# Patient Record
Sex: MALE | Race: WHITE | Employment: FULL TIME | URBAN - NONMETROPOLITAN AREA
[De-identification: names, ages, dates, MRNs, and addresses within clinical notes are randomized per-mention and may not be internally consistent; named-entity substitution may affect disease eponyms.]

---

## 2018-10-29 ENCOUNTER — APPOINTMENT (OUTPATIENT)
Dept: GENERAL RADIOLOGY | Age: 48
End: 2018-10-29
Payer: COMMERCIAL

## 2018-10-29 ENCOUNTER — HOSPITAL ENCOUNTER (EMERGENCY)
Age: 48
Discharge: HOME | End: 2018-10-29
Attending: EMERGENCY MEDICINE
Payer: COMMERCIAL

## 2018-10-29 VITALS
WEIGHT: 250 LBS | BODY MASS INDEX: 33.13 KG/M2 | HEART RATE: 67 BPM | DIASTOLIC BLOOD PRESSURE: 96 MMHG | RESPIRATION RATE: 14 BRPM | TEMPERATURE: 98.4 F | SYSTOLIC BLOOD PRESSURE: 134 MMHG | OXYGEN SATURATION: 96 % | HEIGHT: 73 IN

## 2018-10-29 DIAGNOSIS — R07.89 CHEST PAIN, ATYPICAL: Primary | ICD-10-CM

## 2018-10-29 LAB
ANION GAP SERPL CALCULATED.3IONS-SCNC: 15 MEQ/L (ref 8–16)
BASOPHILS # BLD: 0.5 %
BASOPHILS ABSOLUTE: 0.1 THOU/MM3 (ref 0–0.1)
BUN BLDV-MCNC: 13 MG/DL (ref 7–22)
CALCIUM SERPL-MCNC: 9.3 MG/DL (ref 8.5–10.5)
CHLORIDE BLD-SCNC: 105 MEQ/L (ref 98–111)
CO2: 23 MEQ/L (ref 23–33)
CREAT SERPL-MCNC: 0.9 MG/DL (ref 0.4–1.2)
D-DIMER QUANTITATIVE: 280 NG/ML FEU (ref 0–500)
EKG ATRIAL RATE: 63 BPM
EKG P AXIS: 8 DEGREES
EKG P-R INTERVAL: 130 MS
EKG Q-T INTERVAL: 386 MS
EKG QRS DURATION: 98 MS
EKG QTC CALCULATION (BAZETT): 395 MS
EKG R AXIS: 53 DEGREES
EKG T AXIS: 48 DEGREES
EKG VENTRICULAR RATE: 63 BPM
EOSINOPHIL # BLD: 3.4 %
EOSINOPHILS ABSOLUTE: 0.4 THOU/MM3 (ref 0–0.4)
ERYTHROCYTE [DISTWIDTH] IN BLOOD BY AUTOMATED COUNT: 12.9 % (ref 11.5–14.5)
ERYTHROCYTE [DISTWIDTH] IN BLOOD BY AUTOMATED COUNT: 39.8 FL (ref 35–45)
GFR SERPL CREATININE-BSD FRML MDRD: 90 ML/MIN/1.73M2
GLUCOSE BLD-MCNC: 92 MG/DL (ref 70–108)
HCT VFR BLD CALC: 46.4 % (ref 42–52)
HEMOGLOBIN: 14.9 GM/DL (ref 14–18)
IMMATURE GRANS (ABS): 0.03 THOU/MM3 (ref 0–0.07)
IMMATURE GRANULOCYTES: 0.3 %
LYMPHOCYTES # BLD: 31.9 %
LYMPHOCYTES ABSOLUTE: 3.3 THOU/MM3 (ref 1–4.8)
MCH RBC QN AUTO: 27.1 PG (ref 26–33)
MCHC RBC AUTO-ENTMCNC: 32.1 GM/DL (ref 32.2–35.5)
MCV RBC AUTO: 84.5 FL (ref 80–94)
MONOCYTES # BLD: 6.3 %
MONOCYTES ABSOLUTE: 0.6 THOU/MM3 (ref 0.4–1.3)
NUCLEATED RED BLOOD CELLS: 0 /100 WBC
OSMOLALITY CALCULATION: 284.7 MOSMOL/KG (ref 275–300)
PLATELET # BLD: 278 THOU/MM3 (ref 130–400)
PMV BLD AUTO: 10.2 FL (ref 9.4–12.4)
POTASSIUM SERPL-SCNC: 4.1 MEQ/L (ref 3.5–5.2)
PRO-BNP: 49.4 PG/ML (ref 0–450)
RBC # BLD: 5.49 MILL/MM3 (ref 4.7–6.1)
SEG NEUTROPHILS: 57.6 %
SEGMENTED NEUTROPHILS ABSOLUTE COUNT: 5.9 THOU/MM3 (ref 1.8–7.7)
SODIUM BLD-SCNC: 143 MEQ/L (ref 135–145)
TROPONIN T: < 0.01 NG/ML
TROPONIN T: < 0.01 NG/ML
WBC # BLD: 10.3 THOU/MM3 (ref 4.8–10.8)

## 2018-10-29 PROCEDURE — 85379 FIBRIN DEGRADATION QUANT: CPT

## 2018-10-29 PROCEDURE — 96374 THER/PROPH/DIAG INJ IV PUSH: CPT

## 2018-10-29 PROCEDURE — 6360000002 HC RX W HCPCS: Performed by: EMERGENCY MEDICINE

## 2018-10-29 PROCEDURE — 36415 COLL VENOUS BLD VENIPUNCTURE: CPT

## 2018-10-29 PROCEDURE — 84484 ASSAY OF TROPONIN QUANT: CPT

## 2018-10-29 PROCEDURE — 85025 COMPLETE CBC W/AUTO DIFF WBC: CPT

## 2018-10-29 PROCEDURE — 83880 ASSAY OF NATRIURETIC PEPTIDE: CPT

## 2018-10-29 PROCEDURE — 80048 BASIC METABOLIC PNL TOTAL CA: CPT

## 2018-10-29 PROCEDURE — 93010 ELECTROCARDIOGRAM REPORT: CPT | Performed by: INTERNAL MEDICINE

## 2018-10-29 PROCEDURE — 93005 ELECTROCARDIOGRAM TRACING: CPT | Performed by: EMERGENCY MEDICINE

## 2018-10-29 PROCEDURE — 71045 X-RAY EXAM CHEST 1 VIEW: CPT

## 2018-10-29 PROCEDURE — 99285 EMERGENCY DEPT VISIT HI MDM: CPT

## 2018-10-29 RX ORDER — NAPROXEN 500 MG/1
500 TABLET ORAL 2 TIMES DAILY WITH MEALS
Qty: 20 TABLET | Refills: 0 | Status: SHIPPED | OUTPATIENT
Start: 2018-10-29

## 2018-10-29 RX ORDER — KETOROLAC TROMETHAMINE 30 MG/ML
30 INJECTION, SOLUTION INTRAMUSCULAR; INTRAVENOUS ONCE
Status: COMPLETED | OUTPATIENT
Start: 2018-10-29 | End: 2018-10-29

## 2018-10-29 RX ADMIN — KETOROLAC TROMETHAMINE 30 MG: 30 INJECTION, SOLUTION INTRAMUSCULAR at 21:40

## 2018-10-29 ASSESSMENT — PAIN DESCRIPTION - FREQUENCY
FREQUENCY: CONTINUOUS

## 2018-10-29 ASSESSMENT — ENCOUNTER SYMPTOMS
ABDOMINAL PAIN: 0
EYE REDNESS: 0
COUGH: 0
BACK PAIN: 0
RHINORRHEA: 0
CHEST TIGHTNESS: 1
EYE DISCHARGE: 0
WHEEZING: 0
SHORTNESS OF BREATH: 0
NAUSEA: 0
VOMITING: 0
SORE THROAT: 0
DIARRHEA: 0

## 2018-10-29 ASSESSMENT — PAIN DESCRIPTION - DESCRIPTORS
DESCRIPTORS: TIGHTNESS;CONSTANT
DESCRIPTORS: CONSTANT;TIGHTNESS
DESCRIPTORS: TIGHTNESS;CONSTANT
DESCRIPTORS: TIGHTNESS;CONSTANT

## 2018-10-29 ASSESSMENT — PAIN DESCRIPTION - PAIN TYPE
TYPE: ACUTE PAIN

## 2018-10-29 ASSESSMENT — PAIN SCALES - GENERAL
PAINLEVEL_OUTOF10: 3

## 2018-10-29 ASSESSMENT — PAIN DESCRIPTION - ONSET: ONSET: ON-GOING

## 2018-10-29 ASSESSMENT — PAIN DESCRIPTION - LOCATION
LOCATION: CHEST

## 2018-10-29 ASSESSMENT — HEART SCORE: ECG: 0

## 2018-10-31 ENCOUNTER — OFFICE VISIT (OUTPATIENT)
Dept: CARDIOLOGY CLINIC | Age: 48
End: 2018-10-31
Payer: COMMERCIAL

## 2018-10-31 VITALS
WEIGHT: 264.6 LBS | DIASTOLIC BLOOD PRESSURE: 72 MMHG | HEART RATE: 76 BPM | HEIGHT: 73 IN | SYSTOLIC BLOOD PRESSURE: 130 MMHG | BODY MASS INDEX: 35.07 KG/M2

## 2018-10-31 DIAGNOSIS — M94.0 COSTOCHONDRITIS: Primary | ICD-10-CM

## 2018-10-31 PROCEDURE — G8417 CALC BMI ABV UP PARAM F/U: HCPCS | Performed by: INTERNAL MEDICINE

## 2018-10-31 PROCEDURE — 99203 OFFICE O/P NEW LOW 30 MIN: CPT | Performed by: INTERNAL MEDICINE

## 2018-10-31 PROCEDURE — 1036F TOBACCO NON-USER: CPT | Performed by: INTERNAL MEDICINE

## 2018-10-31 PROCEDURE — G8427 DOCREV CUR MEDS BY ELIG CLIN: HCPCS | Performed by: INTERNAL MEDICINE

## 2018-10-31 PROCEDURE — G8484 FLU IMMUNIZE NO ADMIN: HCPCS | Performed by: INTERNAL MEDICINE

## 2018-10-31 NOTE — PROGRESS NOTES
family history includes Diabetes in his sister; Heart Disease in his mother; High Blood Pressure in his father. There is no family history of bicuspid aortic valve, aneurysms, heart transplant, pacemakers, defibrillators, or sudden cardiac death. Past Surgical History   Past Surgical History:   Procedure Laterality Date    CHOLECYSTECTOMY         Review of Systems   Constitutional: Negative for chills and fever  HENT: Negative for congestion, sinus pressure, sneezing and sore throat. Eyes: Negative for pain, discharge, redness and itching. Respiratory: Negative for apnea, cough  Gastrointestinal: Negative for blood in stool, constipation, diarrhea   Endocrine: Negative for cold intolerance, heat intolerance, polydipsia. Genitourinary: Negative for dysuria, enuresis, flank pain and hematuria. Musculoskeletal: Negative for arthralgias, joint swelling and neck pain. Neurological: Negative for numbness and headaches. Psychiatric/Behavioral: Negative for agitation, confusion, decreased concentration and dysphoric mood. Objective:     BP (!) 144/88   Pulse 76   Ht 6' 1\" (1.854 m)   Wt 264 lb 9.6 oz (120 kg)   BMI 34.91 kg/m²     Wt Readings from Last 3 Encounters:   10/31/18 264 lb 9.6 oz (120 kg)   10/29/18 250 lb (113.4 kg)   04/08/16 260 lb (117.9 kg)     BP Readings from Last 3 Encounters:   10/31/18 (!) 144/88   10/29/18 (!) 134/96   04/08/16 124/68       Nursing note and vitals reviewed. Physical Exam   Constitutional: Oriented to person, place, and time. Appears well-developed and well-nourished. HENT:   Head: Normocephalic and atraumatic. Eyes: EOM are normal. Pupils are equal, round, and reactive to light. Neck: Normal range of motion. Neck supple. No JVD present. Cardiovascular: Normal rate, regular rhythm, normal heart sounds and intact distal pulses. No murmur heard. Pain with palpation of the left side of sternum.   Pulmonary/Chest: Effort normal and breath sounds

## 2020-12-03 ENCOUNTER — HOSPITAL ENCOUNTER (EMERGENCY)
Age: 50
Discharge: HOME | End: 2020-12-03
Payer: COMMERCIAL

## 2020-12-03 VITALS
HEART RATE: 78 BPM | DIASTOLIC BLOOD PRESSURE: 78 MMHG | RESPIRATION RATE: 16 BRPM | TEMPERATURE: 98.3 F | OXYGEN SATURATION: 98 % | SYSTOLIC BLOOD PRESSURE: 132 MMHG

## 2020-12-03 PROCEDURE — 99215 OFFICE O/P EST HI 40 MIN: CPT

## 2020-12-03 PROCEDURE — 99214 OFFICE O/P EST MOD 30 MIN: CPT | Performed by: NURSE PRACTITIONER

## 2020-12-03 PROCEDURE — U0003 INFECTIOUS AGENT DETECTION BY NUCLEIC ACID (DNA OR RNA); SEVERE ACUTE RESPIRATORY SYNDROME CORONAVIRUS 2 (SARS-COV-2) (CORONAVIRUS DISEASE [COVID-19]), AMPLIFIED PROBE TECHNIQUE, MAKING USE OF HIGH THROUGHPUT TECHNOLOGIES AS DESCRIBED BY CMS-2020-01-R: HCPCS

## 2020-12-03 RX ORDER — BENZONATATE 200 MG/1
200 CAPSULE ORAL 3 TIMES DAILY PRN
Qty: 30 CAPSULE | Refills: 0 | Status: SHIPPED | OUTPATIENT
Start: 2020-12-03 | End: 2020-12-10

## 2020-12-03 ASSESSMENT — PAIN DESCRIPTION - LOCATION: LOCATION: BACK

## 2020-12-03 ASSESSMENT — ENCOUNTER SYMPTOMS
VOMITING: 0
RHINORRHEA: 0
SHORTNESS OF BREATH: 0
NAUSEA: 0
COUGH: 1
SORE THROAT: 1
BACK PAIN: 1

## 2020-12-03 ASSESSMENT — PAIN SCALES - GENERAL: PAINLEVEL_OUTOF10: 4

## 2020-12-03 NOTE — ED TRIAGE NOTES
Clayton Heart arrives to room with complaint of cough fatigue body aches chills symptoms started 10 days ago.

## 2020-12-03 NOTE — ED PROVIDER NOTES
[facility]       CHIEF COMPLAINT       Chief Complaint   Patient presents with    Fatigue    Headache    Chills       Nurses Notes reviewed and I agree except as noted in the HPI. HISTORY OF PRESENT ILLNESS   Inis Koyanagi is a 48 y.o. male who presents with complaints of fatigue, headache, chills, and cough for the past week. 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 He states he has ear ringing and fullness with a persistent sore throat. He has tried over-the-counter medications with mild relief. The symptoms are persistent but believes they are slightly improved in the past few days. The history is provided by the patient. REVIEW OF SYSTEMS     Review of Systems   Constitutional: Positive for fatigue. Negative for fever. HENT: Positive for sore throat. Negative for congestion and rhinorrhea. Respiratory: Positive for cough. Negative for shortness of breath. Cardiovascular: Negative for chest pain. Gastrointestinal: Negative for nausea and vomiting. Musculoskeletal: Positive for back pain and myalgias. Skin: Negative for rash. Neurological: Positive for headaches. Negative for weakness. PAST MEDICAL HISTORY         Diagnosis Date    Asthma        SURGICALHISTORY     Patient  has a past surgical history that includes Cholecystectomy. CURRENT MEDICATIONS       Previous Medications    ACETAMINOPHEN (TYLENOL) 325 MG TABLET    Take 650 mg by mouth every 6 hours as needed.     ALBUTEROL SULFATE HFA (VENTOLIN HFA) 108 (90 BASE) MCG/ACT INHALER    Inhale 2 puffs into the lungs every 6 hours as needed for Wheezing    ASPIRIN 81 MG TABLET    Take 81 mg by mouth daily    IBUPROFEN (ADVIL;MOTRIN) 400 MG TABLET    Take 400 mg by mouth every 6 hours as needed for Pain    NAPROXEN (NAPROXEN) 500 MG EC TABLET    Take 1 tablet by mouth 2 times daily (with meals)       ALLERGIES     Patient is is allergic to nuzhat-1 [lidocaine] and penicillins. Patients   Immunization History   Administered Date(s) Administered    Tdap (Boostrix, Adacel) 04/08/2016       FAMILY HISTORY     Patient's family history includes Diabetes in his sister; Heart Disease in his mother; High Blood Pressure in his father. SOCIAL HISTORY     Patient  reports that he has never smoked. He has never used smokeless tobacco. He reports current alcohol use. He reports that he does not use drugs. PHYSICAL EXAM     ED TRIAGE VITALS  BP: 132/78, Temp: 98.3 °F (36.8 °C), Pulse: 78, Resp: 16, SpO2: 98 %,Estimated body mass index is 34.91 kg/m² as calculated from the following:    Height as of 10/31/18: 6' 1\" (1.854 m). Weight as of 10/31/18: 264 lb 9.6 oz (120 kg). ,No LMP for male patient. Physical Exam  Constitutional:       Appearance: Normal appearance. He is not ill-appearing. HENT:      Right Ear: Tympanic membrane, ear canal and external ear normal. There is no impacted cerumen. Left Ear: Tympanic membrane, ear canal and external ear normal. There is no impacted cerumen. Nose: No congestion or rhinorrhea. Mouth/Throat:      Mouth: Mucous membranes are moist.      Pharynx: No posterior oropharyngeal erythema. Neck:      Musculoskeletal: No muscular tenderness. Cardiovascular:      Rate and Rhythm: Normal rate and regular rhythm. Heart sounds: Normal heart sounds. Pulmonary:      Effort: Pulmonary effort is normal.      Breath sounds: Normal breath sounds. No wheezing, rhonchi or rales. Abdominal:      Tenderness: There is no right CVA tenderness or left CVA tenderness. Lymphadenopathy:      Cervical: No cervical adenopathy. Skin:     General: Skin is warm and dry. Capillary Refill: Capillary refill takes less than 2 seconds. Neurological:      Mental Status: He is alert and oriented to person, place, and time.    Psychiatric:         Behavior:

## 2020-12-04 ENCOUNTER — CARE COORDINATION (OUTPATIENT)
Dept: CARE COORDINATION | Age: 50
End: 2020-12-04

## 2020-12-04 NOTE — CARE COORDINATION
Urgent Care visit for COVID exposure. He had symptoms of sore throat, fatigue and cough last week but doesn't really have any symptoms now. Encouraged to continue symptom monitoring. He is self quarantining until gets test result. Patient contacted regarding COVID-19 exposure. Discussed COVID-19 related testing which was pending at this time. Test results were pending. Patient informed of results, if available? NA-still pending. Care Transition Nurse/ Ambulatory Care Manager contacted the patient by telephone to perform post discharge assessment. Call within 2 business days of discharge: Yes. Verified name and  with patient as identifiers. Provided introduction to self, and explanation of the CTN/ACM role, and reason for call due to risk factors for infection and/or exposure to COVID-19. Symptoms reviewed with patient who verbalized the following symptoms: no new symptoms and no worsening symptoms. Due to no new or worsening symptoms encounter was not routed to provider for escalation. Discussed follow-up appointments. If no appointment was previously scheduled, appointment scheduling offered: [facility] follow up appointment(s): No future appointments. Non-[facility] follow up appointment(s): NA    Non-face-to-face services provided:  Reviewed and followed up on pending diagnostic tests and treatments-COVID test  Education of patient/family/caregiver/guardian to support self-management-symptom monitoring and treatment     Advance Care Planning:   Does patient have an Advance Directive:  reviewed and current. Patient has following risk factors of: no known risk factors. CTN/ACM reviewed discharge instructions, medical action plan and red flags such as increased shortness of breath, increasing fever and signs of decompensation with patient who verbalized understanding. Discussed exposure protocols and quarantine with CDC Guidelines What to do if you are sick with coronavirus disease 2019.  Patient was given an opportunity for questions and concerns. The patient agrees to contact the [facility] exposure line [phone], local [facility] department [place] [facility]: ([phone]) and PCP office for questions related to their healthcare. CTN/ACM provided contact information for future needs. Reviewed and educated patient on any new and changed medications related to discharge diagnosis     Patient/family/caregiver given information for Get[employer] Loop and agrees to enroll yes  Patient's preferred e-mail: [email or web address]   Patient's preferred phone number: [phone]  Based on Loop alert triggers, patient will be contacted by nurse care manager for worsening symptoms. Pt will be further monitored by COVID Loop Team based on severity of symptoms and risk factors.

## 2020-12-06 LAB — SARS-COV-2: DETECTED

## 2021-04-14 ENCOUNTER — HOSPITAL ENCOUNTER (EMERGENCY)
Age: 51
Discharge: HOME | End: 2021-04-14
Attending: EMERGENCY MEDICINE
Payer: COMMERCIAL

## 2021-04-14 VITALS
HEART RATE: 66 BPM | OXYGEN SATURATION: 95 % | RESPIRATION RATE: 18 BRPM | DIASTOLIC BLOOD PRESSURE: 81 MMHG | TEMPERATURE: 97.1 F | SYSTOLIC BLOOD PRESSURE: 160 MMHG

## 2021-04-14 DIAGNOSIS — J45.909 ASTHMATIC BRONCHITIS WITHOUT COMPLICATION, UNSPECIFIED ASTHMA SEVERITY, UNSPECIFIED WHETHER PERSISTENT: Primary | ICD-10-CM

## 2021-04-14 PROCEDURE — 6370000000 HC RX 637 (ALT 250 FOR IP): Performed by: STUDENT IN AN ORGANIZED HEALTH CARE EDUCATION/TRAINING PROGRAM

## 2021-04-14 PROCEDURE — 99213 OFFICE O/P EST LOW 20 MIN: CPT

## 2021-04-14 PROCEDURE — 99213 OFFICE O/P EST LOW 20 MIN: CPT | Performed by: EMERGENCY MEDICINE

## 2021-04-14 RX ORDER — PREDNISONE 10 MG/1
40 TABLET ORAL ONCE
Status: COMPLETED | OUTPATIENT
Start: 2021-04-14 | End: 2021-04-14

## 2021-04-14 RX ORDER — PREDNISONE 20 MG/1
40 TABLET ORAL DAILY
Qty: 10 TABLET | Refills: 0 | Status: SHIPPED | OUTPATIENT
Start: 2021-04-14 | End: 2021-04-19

## 2021-04-14 RX ADMIN — PREDNISONE 40 MG: 10 TABLET ORAL at 20:17

## 2021-04-14 ASSESSMENT — ENCOUNTER SYMPTOMS
EYE PAIN: 0
COUGH: 1
DIARRHEA: 0
TROUBLE SWALLOWING: 0
NAUSEA: 0
VOMITING: 0
SHORTNESS OF BREATH: 0
CONSTIPATION: 0
ABDOMINAL PAIN: 0
WHEEZING: 1
BLOOD IN STOOL: 0

## 2021-04-14 NOTE — LETTER
[facility]  [address] [place] [id number]  Phone: [phone]             April 14, 2021    Patient: Steve Cao   YOB: 1970   Date of Visit: 4/14/2021       To Whom It May Concern:    Shanna Benitez was seen and treated in our emergency department on 4/14/2021. He may return to work on 04/17/21.       Sincerely,             Signature:__________________________________

## 2021-04-14 NOTE — ED TRIAGE NOTES
Pt walked to room 4. Pt here with complaints of a sore throat, chest congestion, productive cough--yellow/brown, having trouble laying down, swollen ears. Started Sunday April 4.

## 2021-04-14 NOTE — ED PROVIDER NOTES
[address]       Chief Complaint   Patient presents with    Cough     Productive cough--yellow/brown, sore throat, chest congestion. Pt thought allergies. Nurses Notes reviewed and I agree except as noted in the HPI. HISTORY OF PRESENT ILLNESS   Vivi Arana is a 48 y.o. male who presents sore throat since 4/5/21. Patient believed that he had allergies at the start and was taking mucinex at that time. His symptoms generally improved but he states that he has been having some issues with chest congestion over the weekend. Patient states he has a history of allergy and asthma and has an albuterol inhaler at home which has helped with some of his symptoms. He states that he was wheezing over the weekend. Patient was diagnosed with COVID-19 in December 2020 and recently got the [address] [facility] vaccine on April 4. REVIEW OF SYSTEMS     Review of Systems   Constitutional: Negative for chills, fatigue and fever. HENT: Negative for congestion, ear pain, postnasal drip and trouble swallowing. Eyes: Negative for pain and visual disturbance. Respiratory: Positive for cough and wheezing. Negative for shortness of breath. Cardiovascular: Negative for chest pain and palpitations. Gastrointestinal: Negative for abdominal pain, blood in stool, constipation, diarrhea, nausea and vomiting. Genitourinary: Negative for dysuria and urgency. Skin: Negative for rash and wound. Neurological: Negative for dizziness and headaches. Psychiatric/Behavioral: Negative for dysphoric mood. The patient is not nervous/anxious. PAST MEDICAL HISTORY         Diagnosis Date    Asthma        SURGICAL HISTORY     Patient  has a past surgical history that includes Cholecystectomy.     CURRENT MEDICATIONS       Discharge Medication List as of 4/14/2021  8:05 PM      CONTINUE these medications which have NOT CHANGED    Details   aspirin 81 MG tablet Take 81 mg by mouth dailyHistorical Med      naproxen (NAPROXEN) 500 MG EC tablet Take 1 tablet by mouth 2 times daily (with meals), Disp-20 tablet, R-0Print      ibuprofen (ADVIL;MOTRIN) 400 MG tablet Take 400 mg by mouth every 6 hours as needed for Pain      albuterol sulfate HFA (VENTOLIN HFA) 108 (90 BASE) MCG/ACT inhaler Inhale 2 puffs into the lungs every 6 hours as needed for Wheezing, Disp-1 Inhaler, R-3      acetaminophen (TYLENOL) 325 MG tablet Take 650 mg by mouth every 6 hours as needed. ALLERGIES     Patient is is allergic to nuzhat-1 [lidocaine] and penicillins. FAMILY HISTORY     Patient'sfamily history includes Diabetes in his sister; Heart Disease in his mother; High Blood Pressure in his father. SOCIAL HISTORY     Patient  reports that he has never smoked. He has never used smokeless tobacco. He reports current alcohol use. He reports that he does not use drugs. PHYSICAL EXAM     ED TRIAGE VITALS  BP: (!) 160/81, Temp: 97.1 °F (36.2 °C), Pulse: 66, Resp: 18, SpO2: 95 %  Physical Exam  Vitals signs and nursing note reviewed. Constitutional:       General: He is not in acute distress. Appearance: He is well-developed. He is not diaphoretic. HENT:      Head: Normocephalic and atraumatic. Right Ear: External ear normal.      Left Ear: External ear normal.      Nose: Nose normal.   Eyes:      General: No scleral icterus. Right eye: No discharge. Left eye: No discharge. Conjunctiva/sclera: Conjunctivae normal.   Neck:      Musculoskeletal: Normal range of motion. Cardiovascular:      Rate and Rhythm: Normal rate and regular rhythm. Heart sounds: Normal heart sounds. No murmur. Pulmonary:      Effort: Pulmonary effort is normal.      Breath sounds: Normal breath sounds. Skin:     General: Skin is warm and dry. Findings: No erythema or rash. Neurological:      Mental Status: He is alert and oriented to person, place, and time. Cranial Nerves: No cranial nerve deficit. Psychiatric:         Behavior: Behavior normal.         Thought Content: Thought content normal.         Judgment: Judgment normal.         DIAGNOSTIC RESULTS   Labs:No results found for this visit on 04/14/21. IMAGING:  No orders to display     URGENT CARE COURSE:     Vitals:    04/14/21 1947   BP: (!) 160/81   Pulse: 66   Resp: 18   Temp: 97.1 °F (36.2 °C)   TempSrc: Temporal   SpO2: 95%       Medications   predniSONE (DELTASONE) tablet 40 mg (has no administration in time range)     PROCEDURES:  FINALIMPRESSION      1. Asthmatic bronchitis without complication, unspecified asthma severity, unspecified whether persistent        DISPOSITION/PLAN   DISPOSITION Decision To Discharge 04/14/2021 07:59:46 PM    Patient was seen and evaluated here in the urgent care. Patient was in no acute distress. Vitals signs are stable and appropriate. Patient with symptoms consistent with likely bronchitis at this time. No crackles on exam or fevers therefore less likely to be pneumonia at this time. Given patient's history of asthma patient was instructed to continue using his albuterol inhaler as he needs it. Patient will be given 5-day burst of prednisone 40 mg. Patient will also be given Mucinex DM to be used as needed for chest congestion and cough. Patient voiced understanding of the plan and was in agreement. Patient will be discharged home in stable condition.     PATIENT REFERRED TO:  DO Ramandeep Yanez [address] [facility] [place] [address]          DISCHARGE MEDICATIONS:  Discharge Medication List as of 4/14/2021  8:05 PM      START taking these medications    Details   predniSONE (DELTASONE) 20 MG tablet Take 2 tablets by mouth daily for 5 days, Disp-10 tablet, R-0Normal      dextromethorphan-guaiFENesin (MUCINEX DM)  MG per extended release tablet Take 1 tablet by mouth every 12 hours as needed for Cough or Congestion, Disp-20 tablet, R-0Normal Discharge Medication List as of 4/14/2021  8:05 PM          [facility],        [facility],   Resident  04/14/21 2014

## 2021-04-15 NOTE — ED PROVIDER NOTES
[address]       Chief Complaint   Patient presents with    Cough     Productive cough--yellow/brown, sore throat, chest congestion. Pt thought allergies. Nurses Notes reviewed and I agree except as noted in the HPI. HISTORY OF PRESENT ILLNESS   Dayami Dean is a 48 y.o. male who presents with cough      I, Srikanth Goodman MD,  personally performed and participated in key or critical portions of the evaluation and management including personally performing the exam and medical decision making. I verify the accuracy of the documentation by the resident. Please review resident note for further specifics and details of this urgent care evaluation.      Electronically signed by Satish Rowe MD on 4/14/2021 at 8:05 PM     Satish Rowe MD  04/14/21 2005

## 2021-05-18 ENCOUNTER — HOSPITAL ENCOUNTER (EMERGENCY)
Age: 51
Discharge: HOME | End: 2021-05-18
Payer: COMMERCIAL

## 2021-05-18 ENCOUNTER — APPOINTMENT (OUTPATIENT)
Dept: GENERAL RADIOLOGY | Age: 51
End: 2021-05-18
Payer: COMMERCIAL

## 2021-05-18 VITALS
HEART RATE: 78 BPM | DIASTOLIC BLOOD PRESSURE: 105 MMHG | SYSTOLIC BLOOD PRESSURE: 164 MMHG | RESPIRATION RATE: 18 BRPM | TEMPERATURE: 98.1 F | OXYGEN SATURATION: 94 %

## 2021-05-18 DIAGNOSIS — T18.128A FOOD IMPACTION OF ESOPHAGUS, INITIAL ENCOUNTER: Primary | ICD-10-CM

## 2021-05-18 LAB
ANION GAP SERPL CALCULATED.3IONS-SCNC: 11 MEQ/L (ref 8–16)
BASOPHILS # BLD: 1.1 %
BASOPHILS ABSOLUTE: 0.1 THOU/MM3 (ref 0–0.1)
BUN BLDV-MCNC: 22 MG/DL (ref 7–22)
CALCIUM SERPL-MCNC: 9.9 MG/DL (ref 8.5–10.5)
CHLORIDE BLD-SCNC: 110 MEQ/L (ref 98–111)
CO2: 22 MEQ/L (ref 23–33)
CREAT SERPL-MCNC: 0.8 MG/DL (ref 0.4–1.2)
EOSINOPHIL # BLD: 10.8 %
EOSINOPHILS ABSOLUTE: 0.8 THOU/MM3 (ref 0–0.4)
ERYTHROCYTE [DISTWIDTH] IN BLOOD BY AUTOMATED COUNT: 12.8 % (ref 11.5–14.5)
ERYTHROCYTE [DISTWIDTH] IN BLOOD BY AUTOMATED COUNT: 40.3 FL (ref 35–45)
GFR SERPL CREATININE-BSD FRML MDRD: > 90 ML/MIN/1.73M2
GLUCOSE BLD-MCNC: 105 MG/DL (ref 70–108)
HCT VFR BLD CALC: 48.8 % (ref 42–52)
HEMOGLOBIN: 15.2 GM/DL (ref 14–18)
IMMATURE GRANS (ABS): 0.02 THOU/MM3 (ref 0–0.07)
IMMATURE GRANULOCYTES: 0.3 %
LYMPHOCYTES # BLD: 29.7 %
LYMPHOCYTES ABSOLUTE: 2.2 THOU/MM3 (ref 1–4.8)
MCH RBC QN AUTO: 27 PG (ref 26–33)
MCHC RBC AUTO-ENTMCNC: 31.1 GM/DL (ref 32.2–35.5)
MCV RBC AUTO: 86.7 FL (ref 80–94)
MONOCYTES # BLD: 6.2 %
MONOCYTES ABSOLUTE: 0.5 THOU/MM3 (ref 0.4–1.3)
NUCLEATED RED BLOOD CELLS: 0 /100 WBC
OSMOLALITY CALCULATION: 288.7 MOSMOL/KG (ref 275–300)
PLATELET # BLD: 247 THOU/MM3 (ref 130–400)
PMV BLD AUTO: 9.8 FL (ref 9.4–12.4)
POTASSIUM SERPL-SCNC: 4.1 MEQ/L (ref 3.5–5.2)
RBC # BLD: 5.63 MILL/MM3 (ref 4.7–6.1)
SEG NEUTROPHILS: 51.9 %
SEGMENTED NEUTROPHILS ABSOLUTE COUNT: 3.8 THOU/MM3 (ref 1.8–7.7)
SODIUM BLD-SCNC: 143 MEQ/L (ref 135–145)
WBC # BLD: 7.4 THOU/MM3 (ref 4.8–10.8)

## 2021-05-18 PROCEDURE — 96374 THER/PROPH/DIAG INJ IV PUSH: CPT

## 2021-05-18 PROCEDURE — 2500000003 HC RX 250 WO HCPCS: Performed by: NURSE PRACTITIONER

## 2021-05-18 PROCEDURE — 36415 COLL VENOUS BLD VENIPUNCTURE: CPT

## 2021-05-18 PROCEDURE — 6360000002 HC RX W HCPCS: Performed by: NURSE PRACTITIONER

## 2021-05-18 PROCEDURE — 99282 EMERGENCY DEPT VISIT SF MDM: CPT

## 2021-05-18 PROCEDURE — 96375 TX/PRO/DX INJ NEW DRUG ADDON: CPT

## 2021-05-18 PROCEDURE — 85025 COMPLETE CBC W/AUTO DIFF WBC: CPT

## 2021-05-18 PROCEDURE — 80048 BASIC METABOLIC PNL TOTAL CA: CPT

## 2021-05-18 PROCEDURE — 70360 X-RAY EXAM OF NECK: CPT

## 2021-05-18 RX ORDER — ONDANSETRON 2 MG/ML
4 INJECTION INTRAMUSCULAR; INTRAVENOUS ONCE
Status: COMPLETED | OUTPATIENT
Start: 2021-05-18 | End: 2021-05-18

## 2021-05-18 RX ADMIN — GLUCAGON HYDROCHLORIDE 1 MG: 1 INJECTION, POWDER, FOR SOLUTION INTRAMUSCULAR; INTRAVENOUS; SUBCUTANEOUS at 15:35

## 2021-05-18 RX ADMIN — ONDANSETRON 4 MG: 2 INJECTION INTRAMUSCULAR; INTRAVENOUS at 15:35

## 2021-05-18 ASSESSMENT — ENCOUNTER SYMPTOMS
COLOR CHANGE: 0
SORE THROAT: 0
NAUSEA: 0
VOMITING: 0
TROUBLE SWALLOWING: 1
SHORTNESS OF BREATH: 0
CHEST TIGHTNESS: 0
VOICE CHANGE: 0
COUGH: 0

## 2021-05-18 NOTE — ED PROVIDER NOTES
[facility] Emergency Department    CHIEF COMPLAINT       Chief Complaint   Patient presents with    Foreign Body       Nurses Notes reviewed and I agree except as noted in the HPI. HISTORY OF PRESENT ILLNESS    Elyssa Patrick reddy 48 y.o. male who presents to the ED for evaluation of foreign body in throat. Patient states he was eating roche this afternoon. And he notes that he started to choke. He notes that he felt a fullness in his throat. He denies trouble breathing. He notes every time he tries to drink water it comes right back up. He notes he coughs up a frothy substance. He denies any history of this before in the past.  He notes nausea without vomiting. He notes a past medical history of an ERCP in the past performed by Dr. Nimisha Peng. He denies any other significant history. HPI was provided by the patient. REVIEW OF SYSTEMS     Review of Systems   Constitutional: Negative for activity change, chills and fever. HENT: Positive for trouble swallowing. Negative for congestion, sore throat and voice change. Respiratory: Negative for cough, chest tightness and shortness of breath. Cardiovascular: Negative for chest pain. Gastrointestinal: Negative for nausea and vomiting. Musculoskeletal: Positive for neck pain. Negative for neck stiffness. Skin: Negative for color change. Allergic/Immunologic: Negative for immunocompromised state. Neurological: Negative for dizziness, weakness, light-headedness, numbness and headaches. Hematological: Does not bruise/bleed easily. Psychiatric/Behavioral: Negative for agitation, behavioral problems and confusion. PAST MEDICAL HISTORY     Past Medical History:   Diagnosis Date    Asthma        SURGICALHISTORY      has a past surgical history that includes Cholecystectomy.     CURRENT MEDICATIONS       Discharge Medication List as of 5/18/2021  4:15 PM      CONTINUE these medications which have NOT CHANGED    Details   aspirin 81 MG tablet Take 81 mg by mouth dailyHistorical Med      naproxen (NAPROXEN) 500 MG EC tablet Take 1 tablet by mouth 2 times daily (with meals), Disp-20 tablet, R-0Print      ibuprofen (ADVIL;MOTRIN) 400 MG tablet Take 400 mg by mouth every 6 hours as needed for Pain      albuterol sulfate HFA (VENTOLIN HFA) 108 (90 BASE) MCG/ACT inhaler Inhale 2 puffs into the lungs every 6 hours as needed for Wheezing, Disp-1 Inhaler, R-3      acetaminophen (TYLENOL) 325 MG tablet Take 650 mg by mouth every 6 hours as needed. ALLERGIES     is allergic to nuzhat-1 [lidocaine] and penicillins. FAMILY HISTORY     He indicated that his mother is alive. He indicated that his father is alive. He indicated that the status of his sister is unknown.   family history includes Diabetes in his sister; Heart Disease in his mother; High Blood Pressure in his father. SOCIAL HISTORY       Social History     Socioeconomic History    Marital status:      Spouse name: Not on file    Number of children: Not on file    Years of education: Not on file    Highest education level: Not on file   Occupational History    Not on file   Tobacco Use    Smoking status: Never Smoker    Smokeless tobacco: Never Used   Vaping Use    Vaping Use: Never used   Substance and Sexual Activity    Alcohol use: Yes     Comment: rare    Drug use: No    Sexual activity: Not on file   Other Topics Concern    Not on file   Social History Narrative    Not on file     Social Determinants of Health     Financial Resource Strain:     Difficulty of Paying Living Expenses:    Food Insecurity:     Worried About Running Out of Food in the Last Year:     [address] in the Last Year:    Transportation Needs:     Lack of Transportation (Medical):      Lack of Transportation (Non-Medical):    Physical Activity:     Days of Exercise per Week:     Minutes of Exercise per Session:    Stress:     Feeling of Stress :    Social Connections:     Frequency of Communication with Friends and Family:     Frequency of Social Gatherings with Friends and Family:     Attends [employer] Services:     Active Member of Clubs or Organizations:     Attends Club or Organization Meetings:     Marital Status:    Intimate Partner Violence:     Fear of Current or Ex-Partner:     Emotionally Abused:     Physically Abused:     Sexually Abused:        PHYSICAL EXAM     INITIAL VITALS:  oral temperature is 98.1 °F (36.7 °C). His blood pressure is 164/105 (abnormal) and his pulse is 78. His respiration is 18 and oxygen saturation is 94%. Physical Exam  Constitutional:       General: He is not in acute distress. Appearance: Normal appearance. He is normal weight. He is not toxic-appearing. HENT:      Head: Normocephalic. Nose: Nose normal.      Mouth/Throat:      Mouth: Mucous membranes are moist.      Pharynx: No oropharyngeal exudate or posterior oropharyngeal erythema. Cardiovascular:      Rate and Rhythm: Normal rate. Pulses: Normal pulses. Heart sounds: Normal heart sounds. Pulmonary:      Effort: Pulmonary effort is normal.   Abdominal:      General: There is no distension. Musculoskeletal:         General: Normal range of motion. Cervical back: Normal range of motion and neck supple. Skin:     General: Skin is warm. Capillary Refill: Capillary refill takes less than 2 seconds. Neurological:      General: No focal deficit present. Mental Status: He is alert and oriented to person, place, and time. Mental status is at baseline. Psychiatric:         Mood and Affect: Mood normal.         Behavior: Behavior normal.         Thought Content:  Thought content normal.         DIFFERENTIAL DIAGNOSIS:   Foreign body, globus sensation, aspiration    DIAGNOSTIC RESULTS       RADIOLOGY: non-plainfilm images(s) such as CT, Ultrasound and MRI are read by the radiologist.  Plain radiographic images are visualized and preliminarily interpreted by the emergency physician unless otherwise stated below. XR NECK SOFT TISSUE   Final Result   1. Unremarkable soft tissue neck. **This report has been created using voice recognition software. It may contain minor errors which are inherent in voice recognition technology. **      Final report electronically signed by Dr Chelsea Munoz on 5/18/2021 3:02 PM            LABS:   Labs Reviewed   CBC WITH AUTO DIFFERENTIAL - Abnormal; Notable for the following components:       Result Value    MCHC 31.1 (*)     Eosinophils Absolute 0.8 (*)     All other components within normal limits   BASIC METABOLIC PANEL - Abnormal; Notable for the following components:    CO2 22 (*)     All other components within normal limits   ANION GAP   GLOMERULAR FILTRATION RATE, ESTIMATED   OSMOLALITY       EMERGENCY DEPARTMENT COURSE:   Vitals:    Vitals:    05/18/21 1430   BP: (!) 164/105   Pulse: 78   Resp: 18   Temp: 98.1 °F (36.7 °C)   TempSrc: Oral   SpO2: 94%       MDM    Patient was seen and evaluated in the emergency department, patient appears to be in no acute distress, vital signs were reviewed, hypertension noted. Physical exam was completed, he had soft cervical neck, nothing in the posterior pharynx that I could visualize. I discussed the case with Kelly Curiel, GEOFF for gastroenterology, they requested an x-ray of the neck. That was performed and no acute findings noted. He then requested glucagon be administered. This was completed, patient had excellent fact, patient was able to tolerate oral intake after this. I discussed my findings my plan of care with the patient he is amenable with discharge  Medications   glucagon (rDNA) injection 1 mg (1 mg Intravenous Given 5/18/21 1535)   ondansetron (ZOFRAN) injection 4 mg (4 mg Intravenous Given 5/18/21 1535)       Patient was seenindependently by myself. The patient's final impression and disposition and plan was determined by myself.      CRITICAL CARE:

## 2021-05-18 NOTE — ED NOTES
Pt to the ED with complaints of food stuck in throat. Pt states that he was eating roche at 1345 and a piece feels like it is stuck in his throat. Pt does not have difficulty speaking or breathing. Pt states that he has not been able to swallow water.       Elizabeth Russo RN  05/18/21 5782

## 2021-09-21 ENCOUNTER — ANESTHESIA (OUTPATIENT)
Dept: ENDOSCOPY | Age: 51
End: 2021-09-21
Payer: COMMERCIAL

## 2021-09-21 ENCOUNTER — HOSPITAL ENCOUNTER (EMERGENCY)
Age: 51
Discharge: HOME | End: 2021-09-21
Attending: INTERNAL MEDICINE
Payer: COMMERCIAL

## 2021-09-21 ENCOUNTER — APPOINTMENT (OUTPATIENT)
Dept: GENERAL RADIOLOGY | Age: 51
End: 2021-09-21
Payer: COMMERCIAL

## 2021-09-21 ENCOUNTER — ANESTHESIA EVENT (OUTPATIENT)
Dept: ENDOSCOPY | Age: 51
End: 2021-09-21
Payer: COMMERCIAL

## 2021-09-21 VITALS
OXYGEN SATURATION: 93 % | RESPIRATION RATE: 17 BRPM | TEMPERATURE: 97.5 F | WEIGHT: 250 LBS | HEART RATE: 87 BPM | HEIGHT: 73 IN | SYSTOLIC BLOOD PRESSURE: 123 MMHG | DIASTOLIC BLOOD PRESSURE: 79 MMHG | BODY MASS INDEX: 33.13 KG/M2

## 2021-09-21 VITALS — SYSTOLIC BLOOD PRESSURE: 119 MMHG | OXYGEN SATURATION: 97 % | DIASTOLIC BLOOD PRESSURE: 79 MMHG

## 2021-09-21 DIAGNOSIS — T18.128A FOOD IMPACTION OF ESOPHAGUS, INITIAL ENCOUNTER: Primary | ICD-10-CM

## 2021-09-21 PROCEDURE — 2500000003 HC RX 250 WO HCPCS: Performed by: REGISTERED NURSE

## 2021-09-21 PROCEDURE — 71046 X-RAY EXAM CHEST 2 VIEWS: CPT

## 2021-09-21 PROCEDURE — 96372 THER/PROPH/DIAG INJ SC/IM: CPT

## 2021-09-21 PROCEDURE — 3700000001 HC ADD 15 MINUTES (ANESTHESIA): Performed by: INTERNAL MEDICINE

## 2021-09-21 PROCEDURE — C1726 CATH, BAL DIL, NON-VASCULAR: HCPCS | Performed by: INTERNAL MEDICINE

## 2021-09-21 PROCEDURE — 99285 EMERGENCY DEPT VISIT HI MDM: CPT

## 2021-09-21 PROCEDURE — 2580000003 HC RX 258: Performed by: INTERNAL MEDICINE

## 2021-09-21 PROCEDURE — 3609012900 HC EGD FOREIGN BODY REMOVAL: Performed by: INTERNAL MEDICINE

## 2021-09-21 PROCEDURE — 6360000002 HC RX W HCPCS: Performed by: REGISTERED NURSE

## 2021-09-21 PROCEDURE — 7100000000 HC PACU RECOVERY - FIRST 15 MIN: Performed by: INTERNAL MEDICINE

## 2021-09-21 PROCEDURE — 2500000003 HC RX 250 WO HCPCS: Performed by: NURSE PRACTITIONER

## 2021-09-21 PROCEDURE — 2720000010 HC SURG SUPPLY STERILE: Performed by: INTERNAL MEDICINE

## 2021-09-21 PROCEDURE — 2709999900 HC NON-CHARGEABLE SUPPLY: Performed by: INTERNAL MEDICINE

## 2021-09-21 PROCEDURE — 3700000000 HC ANESTHESIA ATTENDED CARE: Performed by: INTERNAL MEDICINE

## 2021-09-21 PROCEDURE — 7100000001 HC PACU RECOVERY - ADDTL 15 MIN: Performed by: INTERNAL MEDICINE

## 2021-09-21 RX ORDER — DEXAMETHASONE SODIUM PHOSPHATE 4 MG/ML
INJECTION, SOLUTION INTRA-ARTICULAR; INTRALESIONAL; INTRAMUSCULAR; INTRAVENOUS; SOFT TISSUE PRN
Status: DISCONTINUED | OUTPATIENT
Start: 2021-09-21 | End: 2021-09-21 | Stop reason: SDUPTHER

## 2021-09-21 RX ORDER — SODIUM CHLORIDE, SODIUM LACTATE, POTASSIUM CHLORIDE, CALCIUM CHLORIDE 600; 310; 30; 20 MG/100ML; MG/100ML; MG/100ML; MG/100ML
INJECTION, SOLUTION INTRAVENOUS CONTINUOUS
Status: DISCONTINUED | OUTPATIENT
Start: 2021-09-21 | End: 2021-09-21 | Stop reason: HOSPADM

## 2021-09-21 RX ORDER — EPHEDRINE SULFATE/0.9% NACL/PF 50 MG/5 ML
SYRINGE (ML) INTRAVENOUS PRN
Status: DISCONTINUED | OUTPATIENT
Start: 2021-09-21 | End: 2021-09-21 | Stop reason: SDUPTHER

## 2021-09-21 RX ORDER — ONDANSETRON 2 MG/ML
INJECTION INTRAMUSCULAR; INTRAVENOUS PRN
Status: DISCONTINUED | OUTPATIENT
Start: 2021-09-21 | End: 2021-09-21 | Stop reason: SDUPTHER

## 2021-09-21 RX ORDER — GLYCOPYRROLATE 1 MG/5 ML
SYRINGE (ML) INTRAVENOUS PRN
Status: DISCONTINUED | OUTPATIENT
Start: 2021-09-21 | End: 2021-09-21 | Stop reason: SDUPTHER

## 2021-09-21 RX ORDER — PROPOFOL 10 MG/ML
INJECTION, EMULSION INTRAVENOUS PRN
Status: DISCONTINUED | OUTPATIENT
Start: 2021-09-21 | End: 2021-09-21 | Stop reason: SDUPTHER

## 2021-09-21 RX ORDER — SUCCINYLCHOLINE CHLORIDE 20 MG/ML
INJECTION INTRAMUSCULAR; INTRAVENOUS PRN
Status: DISCONTINUED | OUTPATIENT
Start: 2021-09-21 | End: 2021-09-21 | Stop reason: SDUPTHER

## 2021-09-21 RX ADMIN — Medication 10 MG: at 11:40

## 2021-09-21 RX ADMIN — GLUCAGON HYDROCHLORIDE 1 MG: KIT at 03:21

## 2021-09-21 RX ADMIN — ONDANSETRON HYDROCHLORIDE 4 MG: 4 INJECTION, SOLUTION INTRAMUSCULAR; INTRAVENOUS at 11:48

## 2021-09-21 RX ADMIN — Medication 0.4 MG: at 11:48

## 2021-09-21 RX ADMIN — SODIUM CHLORIDE, POTASSIUM CHLORIDE, SODIUM LACTATE AND CALCIUM CHLORIDE: 600; 310; 30; 20 INJECTION, SOLUTION INTRAVENOUS at 10:19

## 2021-09-21 RX ADMIN — DEXAMETHASONE SODIUM PHOSPHATE 10 MG: 4 INJECTION, SOLUTION INTRAMUSCULAR; INTRAVENOUS at 11:48

## 2021-09-21 RX ADMIN — SUCCINYLCHOLINE CHLORIDE 200 MG: 20 INJECTION, SOLUTION INTRAMUSCULAR; INTRAVENOUS at 11:36

## 2021-09-21 RX ADMIN — GLUCAGON HYDROCHLORIDE 1 MG: KIT at 04:49

## 2021-09-21 RX ADMIN — PROPOFOL 200 MG: 10 INJECTION, EMULSION INTRAVENOUS at 11:36

## 2021-09-21 ASSESSMENT — PULMONARY FUNCTION TESTS
PIF_VALUE: 13
PIF_VALUE: 37
PIF_VALUE: 15
PIF_VALUE: 17
PIF_VALUE: 0
PIF_VALUE: 16
PIF_VALUE: 14
PIF_VALUE: 2
PIF_VALUE: 14
PIF_VALUE: 27
PIF_VALUE: 15
PIF_VALUE: 12
PIF_VALUE: 12
PIF_VALUE: 14
PIF_VALUE: 2
PIF_VALUE: 11
PIF_VALUE: 4
PIF_VALUE: 14
PIF_VALUE: 11
PIF_VALUE: 3
PIF_VALUE: 14
PIF_VALUE: 3
PIF_VALUE: 14
PIF_VALUE: 14
PIF_VALUE: 5
PIF_VALUE: 11
PIF_VALUE: 3
PIF_VALUE: 1
PIF_VALUE: 13
PIF_VALUE: 12
PIF_VALUE: 15
PIF_VALUE: 17
PIF_VALUE: 11
PIF_VALUE: 14
PIF_VALUE: 3
PIF_VALUE: 3
PIF_VALUE: 1
PIF_VALUE: 17
PIF_VALUE: 12
PIF_VALUE: 11
PIF_VALUE: 15
PIF_VALUE: 14
PIF_VALUE: 12
PIF_VALUE: 3

## 2021-09-21 ASSESSMENT — ENCOUNTER SYMPTOMS
VOMITING: 0
NAUSEA: 0
EYE REDNESS: 0
CHEST TIGHTNESS: 0
BACK PAIN: 0
COUGH: 0
ABDOMINAL PAIN: 0
RHINORRHEA: 0
TROUBLE SWALLOWING: 1

## 2021-09-21 ASSESSMENT — PAIN DESCRIPTION - PAIN TYPE: TYPE: ACUTE PAIN

## 2021-09-21 ASSESSMENT — PAIN SCALES - GENERAL
PAINLEVEL_OUTOF10: 3
PAINLEVEL_OUTOF10: 7
PAINLEVEL_OUTOF10: 4
PAINLEVEL_OUTOF10: 4

## 2021-09-21 ASSESSMENT — PAIN DESCRIPTION - LOCATION
LOCATION: THROAT

## 2021-09-21 ASSESSMENT — PAIN - FUNCTIONAL ASSESSMENT: PAIN_FUNCTIONAL_ASSESSMENT: 0-10

## 2021-09-21 NOTE — ED PROVIDER NOTES
(ADVIL;MOTRIN) 400 MG TABLET    Take 400 mg by mouth every 6 hours as needed for Pain    NAPROXEN (NAPROXEN) 500 MG EC TABLET    Take 1 tablet by mouth 2 times daily (with meals)       ALLERGIES     is allergic to nuzhat-1 [lidocaine] and penicillins. FAMILY HISTORY     He indicated that his mother is alive. He indicated that his father is alive. He indicated that the status of his sister is unknown.   family history includes Diabetes in his sister; Heart Disease in his mother; High Blood Pressure in his father. SOCIAL HISTORY       Social History     Socioeconomic History    Marital status:      Spouse name: Not on file    Number of children: Not on file    Years of education: Not on file    Highest education level: Not on file   Occupational History    Not on file   Tobacco Use    Smoking status: Never Smoker    Smokeless tobacco: Never Used   Vaping Use    Vaping Use: Never used   Substance and Sexual Activity    Alcohol use: Yes     Comment: rare    Drug use: No    Sexual activity: Not on file   Other Topics Concern    Not on file   Social History Narrative    Not on file     Social Determinants of Health     Financial Resource Strain:     Difficulty of Paying Living Expenses:    Food Insecurity:     Worried About Running Out of Food in the Last Year:     [address] in the Last Year:    Transportation Needs:     Lack of Transportation (Medical):      Lack of Transportation (Non-Medical):    Physical Activity:     Days of Exercise per Week:     Minutes of Exercise per Session:    Stress:     Feeling of Stress :    Social Connections:     Frequency of Communication with Friends and Family:     Frequency of Social Gatherings with Friends and Family:     Attends [employer] Services:     Active Member of Clubs or Organizations:     Attends Club or Organization Meetings:     Marital Status:    Intimate Partner Violence:     Fear of Current or Ex-Partner:     Emotionally Abused:     Physically Abused:     Sexually Abused:        PHYSICAL EXAM     INITIAL VITALS:  height is 6' 1\" (1.854 m) and weight is 250 lb (113.4 kg). His temperature is 97.8 °F (36.6 °C). His blood pressure is 160/97 (abnormal) and his pulse is 81. His respiration is 20 and oxygen saturation is 95%. Physical Exam  Vitals and nursing note reviewed. Constitutional:       General: He is not in acute distress. Appearance: Normal appearance. He is well-developed. He is not ill-appearing or diaphoretic. HENT:      Head: Normocephalic and atraumatic. Nose: Nose normal.      Mouth/Throat:      Mouth: Mucous membranes are moist.      Pharynx: Oropharynx is clear. Eyes:      General:         Right eye: No discharge. Left eye: No discharge. Conjunctiva/sclera: Conjunctivae normal.   Neck:      Trachea: No tracheal deviation. Cardiovascular:      Rate and Rhythm: Normal rate and regular rhythm. Pulses: Normal pulses. Heart sounds: Normal heart sounds. No murmur heard. No gallop. Comments: Normal capillary refill  Pulmonary:      Effort: Pulmonary effort is normal. No respiratory distress. Breath sounds: Normal breath sounds. No stridor. Abdominal:      General: Bowel sounds are normal.      Palpations: Abdomen is soft. Musculoskeletal:         General: No tenderness or deformity. Normal range of motion. Cervical back: Normal range of motion. Skin:     General: Skin is warm and dry. Capillary Refill: Capillary refill takes less than 2 seconds. Coloration: Skin is not pale. Findings: No erythema or rash. Neurological:      General: No focal deficit present. Mental Status: He is alert and oriented to person, place, and time. Cranial Nerves: No cranial nerve deficit. Psychiatric:         Mood and Affect: Mood normal.         Behavior: Behavior normal.         DIFFERENTIAL DIAGNOSIS:   Food bolus    DIAGNOSTIC RESULTS     EKG:  All EKG's are interpreted by the Emergency Department Physician who eithersigns or Co-signs this chart in the absence of a cardiologist.        RADIOLOGY: non-plainfilm images(s) such as CT, Ultrasound and MRI are read by the radiologist.  Plain radiographic images are visualized and preliminarily interpreted by the emergency physician unless otherwise stated below. XR CHEST (2 VW)    (Results Pending)         LABS:   Labs Reviewed - No data to display    EMERGENCY DEPARTMENT COURSE:   Vitals:    Vitals:    09/21/21 0324 09/21/21 0405 09/21/21 0451 09/21/21 0607   BP:  (!) 153/96 (!) 155/89 (!) 160/97   Pulse: 72 79 80 81   Resp: 18 20 20 20   Temp:       SpO2: 95% 95% 96% 95%   Weight: 250 lb (113.4 kg)      Height:                                 MDM    Patient was seen in the ER for a food bolus. Attempted glucagon x 2 without resolution. Consulted Dr. Jim Stockton as the patient cannot swallow anything, even water. He will take him to Endo around 11:30. Patient is updated. Care is transferred to [place], [place]. Medications   sterile water injection (has no administration in time range)   glucagon (rDNA) injection 1 mg (1 mg IntraMUSCular Given 9/21/21 0321)   glucagon (rDNA) injection 1 mg (1 mg IntraMUSCular Given 9/21/21 0449)         Patient was seen independently by myself. The patient's final impression and disposition and plan was determined by myself. Strict return precautions and follow up instructions were discussed with the patient prior to discharge, with which the patient agrees. Physical assessment findings, diagnostic testing(s) if applicable, and vital signs reviewed with patient/patient representative. Questions answered. Medications asdirected, including OTC medications for supportive care. Education provided on medications. Differential diagnosis(s) discussed with patient/patient representative. Home care/self care instructions reviewed withpatient/patient representative.   Patient is to follow-up with family care provider in 2-3 days if no improvement. Patient is to go to the emergency department if symptoms worsen. Patient/patient representative isaware of care plan, questions answered, verbalizes understanding and is in agreement. CRITICAL CARE:   None    CONSULTS:  OTHER--Dr. Brandin Brown    PROCEDURES:  None    FINAL IMPRESSION     1. Food impaction of esophagus, initial encounter          DISPOSITION/PLAN   DISPOSITION Ed Observation 09/21/2021 05:50:13 AM      PATIENT REFERREDTO:  No follow-up provider specified.     DISCHARGE MEDICATIONS:  New Prescriptions    No medications on file       (Please note that portions of this note were completed with a voice recognition program.  Efforts were made to edit the dictations but occasionally words are mis-transcribed.)         SHARLENE Cunningham - SHARLENE Harper CNP  09/21/21 4774

## 2021-09-21 NOTE — ANESTHESIA PRE PROCEDURE
Department of Anesthesiology  Preprocedure Note       Name:  Mynor Patterson   Age:  46 y.o.  :  1970                                          MRN:  [id number]         Date:  2021      Surgeon: Kodi Cedeno):  Griselda Ransom, MD    Procedure: Procedure(s):  EGD FOREIGN BODY REMOVAL    Medications prior to admission:   Prior to Admission medications    Medication Sig Start Date End Date Taking? Authorizing Provider   aspirin 81 MG tablet Take 81 mg by mouth daily    Historical Provider, MD   naproxen (NAPROXEN) 500 MG EC tablet Take 1 tablet by mouth 2 times daily (with meals) 10/29/18   Destiny Josue MD   ibuprofen (ADVIL;MOTRIN) 400 MG tablet Take 400 mg by mouth every 6 hours as needed for Pain    Historical Provider, MD   albuterol sulfate HFA (VENTOLIN HFA) 108 (90 BASE) MCG/ACT inhaler Inhale 2 puffs into the lungs every 6 hours as needed for Wheezing 12/26/15   Mark Garibay MD   acetaminophen (TYLENOL) 325 MG tablet Take 650 mg by mouth every 6 hours as needed. Historical Provider, MD       Current medications:    Current Facility-Administered Medications   Medication Dose Route Frequency Provider Last Rate Last Admin    sterile water injection             lactated ringers infusion   IntraVENous Continuous Griselda Ransom, MD 75 mL/hr at 21 1019 New Bag at 21 1019       Allergies: Allergies   Allergen Reactions    Jori-1 [Lidocaine] Anaphylaxis     All in jori family    Penicillins Hives       Problem List:  There is no problem list on file for this patient.       Past Medical History:        Diagnosis Date    Asthma        Past Surgical History:        Procedure Laterality Date    CHOLECYSTECTOMY         Social History:    Social History     Tobacco Use    Smoking status: Never Smoker    Smokeless tobacco: Never Used   Substance Use Topics    Alcohol use: Yes     Comment: rare                                Counseling given: Not Answered      Vital Signs (Current):   Vitals:    09/21/21 0451 09/21/21 0607 09/21/21 0804 09/21/21 1010   BP: (!) 155/89 (!) 160/97 (!) 162/99 137/87   Pulse: 80 81 79 72   Resp: 20 20 18 18   Temp:    35.9 °C (96.7 °F)   TempSrc:    Temporal   SpO2: 96% 95% 97% 96%   Weight:    250 lb (113.4 kg)   Height:    6' 1\" (1.854 m)                                              BP Readings from Last 3 Encounters:   09/21/21 137/87   05/18/21 (!) 164/105   04/14/21 (!) 160/81       NPO Status: Time of last liquid consumption: 2230                        Time of last solid consumption: 2230                        Date of last liquid consumption: 09/20/21                        Date of last solid food consumption: 09/20/21    BMI:   Wt Readings from Last 3 Encounters:   09/21/21 250 lb (113.4 kg)   10/31/18 264 lb 9.6 oz (120 kg)   10/29/18 250 lb (113.4 kg)     Body mass index is 32.98 kg/m². CBC:   Lab Results   Component Value Date    WBC 7.4 05/18/2021    RBC 5.63 05/18/2021    HGB 15.2 05/18/2021    HCT 48.8 05/18/2021    MCV 86.7 05/18/2021     05/18/2021       CMP:   Lab Results   Component Value Date     05/18/2021    K 4.1 05/18/2021     05/18/2021    CO2 22 05/18/2021    BUN 22 05/18/2021    CREATININE 0.8 05/18/2021    LABGLOM >90 05/18/2021    GLUCOSE 105 05/18/2021    CALCIUM 9.9 05/18/2021       POC Tests: No results for input(s): POCGLU, POCNA, POCK, POCCL, POCBUN, POCHEMO, POCHCT in the last 72 hours.     Coags: No results found for: PROTIME, INR, APTT    HCG (If Applicable): No results found for: PREGTESTUR, PREGSERUM, HCG, HCGQUANT     ABGs: No results found for: PHART, PO2ART, KPB0PRG, CRL4RCY, BEART, N8XPNGKP     Type & Screen (If Applicable):  No results found for: LABABO, LABRH    Drug/Infectious Status (If Applicable):  No results found for: HIV, HEPCAB    COVID-19 Screening (If Applicable):   Lab Results   Component Value Date    COVID19 Detected 12/03/2020           Anesthesia Evaluation  Patient summary reviewed and Nursing notes reviewed  Airway: Mallampati: II  TM distance: >3 FB   Neck ROM: full  Mouth opening: > = 3 FB Dental:      Comment: Missing and decayed teeth     Pulmonary:normal exam    (+) asthma:                            Cardiovascular:Negative CV ROS          ECG reviewed                        Neuro/Psych:   Negative Neuro/Psych ROS              GI/Hepatic/Renal: Neg GI/Hepatic/Renal ROS            Endo/Other: Negative Endo/Other ROS                    Abdominal:             Vascular: negative vascular ROS. Other Findings:             Anesthesia Plan      general     ASA 3       Induction: intravenous. Anesthetic plan and risks discussed with patient. Plan discussed with attending.                   Taye Dolan, APRN - CRNA   9/21/2021

## 2021-09-21 NOTE — ED NOTES
Pt medicated per MAR again. VS reassessed.  PT instructed he can walk around and try to drink again while medication starts to work      Arun Sales RN  09/21/21 7480

## 2021-09-21 NOTE — ED NOTES
Pt states he may have felt it move a little bit maybe. Pt states last time they had him walk around a little bit. VS reassessed.  Will notify Neema Goel RN  09/21/21 2314

## 2021-09-21 NOTE — PROGRESS NOTES
EGD complete, photos taken, no specimens taken    balloon dilator used at 15 mm, pt tolerated procedure well    patient taken to pacu and report given to pacu rn    Scope Number [id number] used.

## 2021-09-21 NOTE — CONSULTS
Consult History & Physical      Patient:  Halie Thomas  YOB: 1970  MRN: [id number]     Acct: [de-identified]    Chief Complaint:  dysphagia    Date of Service: Pt seen/examined in consultation on 9/21/2021    History Of Present Illness:      46 y.o. male PMH asthma who we are asked to see/evaluate for medical management of foreign body removal and dysphagia with mild odynophagia. The patient reports that at about 10:30 last night he was eating some pork and noticed that he was having difficulties with swallowing it. He felt that the pork was getting stuck towards the upper part of his throat and was causing him some mild irritation. Of note the patient was previously admitted in May for previous issues of dysphagia related to eating roche that were resolved when the patient was given glucagon. The patient feels that soda and water occasionally help the pieces of meat stuck get down to the bottom of his throat but he would still feel the irritation from time to time. The patient occasionally takes ibuprofen and naproxen as needed for pain and isn't on any blood thinners at this time. In the past year, the patient has had about 3-4 episodes of dysphagia mostly involving meat-based products including roche, pork, and beef. Past Medical History:    Past Medical History:   Diagnosis Date    Asthma        Home Medications:  Prior to Admission medications    Medication Sig Start Date End Date Taking?  Authorizing Provider   aspirin 81 MG tablet Take 81 mg by mouth daily    Historical Provider, MD   naproxen (NAPROXEN) 500 MG EC tablet Take 1 tablet by mouth 2 times daily (with meals) 10/29/18   Kourtney Driver MD   ibuprofen (ADVIL;MOTRIN) 400 MG tablet Take 400 mg by mouth every 6 hours as needed for Pain    Historical Provider, MD   albuterol sulfate HFA (VENTOLIN HFA) 108 (90 BASE) MCG/ACT inhaler Inhale 2 puffs into the lungs every 6 hours as needed for Wheezing 12/26/15   Pooja Lee MD acetaminophen (TYLENOL) 325 MG tablet Take 650 mg by mouth every 6 hours as needed. Historical Provider, MD       Surgical History:  Past Surgical History:   Procedure Laterality Date    CHOLECYSTECTOMY         Family History:  Family History   Problem Relation Age of Onset    Heart Disease Mother     High Blood Pressure Father     Diabetes Sister        Past GI History:  The patient has never seen a representative of [facility] in the past before  -Had cholecystectomy done in the past  -Had to have a tube put in him by Dr. Mehran Almonte in order to help with the cholecystectomy    Allergies:  Jori-1 [lidocaine] and Penicillins    Social History:   TOBACCO:   reports that he has never smoked. He has never used smokeless tobacco.  ETOH:   reports current alcohol use. Review Of Systems  GENERAL: No fever, chills or weight loss. EYES:  No  blurred vision, double vision   CARDIOVASCULAR: No chest pain or palpitations. RESPIRATORY:  No dyspnea or cough. GI:  See HPI  MUSCULOSKELETAL: No new painful or swollen joints or myalgias. :   No dysuria or hematuria. SKIN:  No rashes or jaundice. NEUROLOGIC:  No headaches or seizures, numbness or tingling of arms, or legs. PSYCH:  No anxiety or depression. ENDOCRINE:  No polyuria or polydipsia. BLOOD:  No anemia, bleeding disorder, blood or blood product transfusion. PHYSICAL EXAM:  /87   Pulse 72   Temp 96.7 °F (35.9 °C) (Temporal)   Resp 18   Ht 6' 1\" (1.854 m)   Wt 250 lb (113.4 kg)   SpO2 96%   BMI 32.98 kg/m²     General appearance: No apparent distress, appears stated age and cooperative. HEENT: Normal cephalic, atraumatic without obvious deformity. Pupils equal, round, and reactive to light. Neck: Supple, with full range of motion. No jugular venous distention. Trachea midline. Respiratory:  Normal respiratory effort. Clear to auscultation, bilaterally without Rales/Wheezes/Rhonchi.   Cardiovascular: Regular rate and rhythm without murmurs, rubs or gallops. Abdomen: Soft, non-tender, non-distended with active bowel sounds. Musculoskeletal: No clubbing, cyanosis or edema bilaterally. Skin: Pink, warm, dry. No rashes or lesions. Psychiatric: Alert and oriented, thought content appropriate, normal insight    Labs:   No results for input(s): WBC, HGB, HCT, PLT in the last 72 hours. No results for input(s): NA, K, CL, CO2, BUN, CREATININE, CALCIUM, PHOS in the last 72 hours. Invalid input(s): MAGNES  No results for input(s): AST, ALT, BILIDIR, BILITOT, ALKPHOS in the last 72 hours. No results for input(s): INR in the last 72 hours. Radiology:   Chest x-ray showed cardiomegaly with bilateral airspace disease which may be secondary to atelectasis or pneumonia    Code Status: Full Code    ASSESSMENT:  1. Dysphagia likely secondary to food impaction; possible etiologies include GERD, esophageal spasm, eosinophilic esophagitis  2. History of asthma    PLAN:     Patient will have EGD with possible biopsy done in order to remove any food bolus and dilate the esophagus   If patient notes improvement in pain and dysphagia, can likely be discharged today and follow-up outpatient with Dr. Kaylee Nataraajn Patient NPO for procedure scheduled for 11:30 AM   Patient advised to take ibuprofen and naproxen only as needed       Case reviewed and impression/plan reviewed in collaboration with Dr. Kingsley Candelario and Fanny John NP  Electronically signed by Aravind Hoffman DO on 9/21/2021 at 10:25 AM    [facility]  Thank you for the consultation.

## 2021-09-21 NOTE — ED NOTES
Pt walking around room. PT then given water and pop to drink to potentially help him.  Will continue to monitor      Arun Sales RN  09/21/21 5180

## 2021-09-21 NOTE — ED NOTES
Pt medicated per MAR. No distress noted. VS reassessed.  Will continue to monitor     Tana Dickerson RN  09/21/21 8763

## 2021-09-21 NOTE — PROCEDURES
[facility] Endoscopy     EGD Report    Patient: Chacorta Ingram  : 1970  Acct#: [de-identified]     BRIEF HISTORY AND INDICATIONS:    The patient is a 46 y.o.,  male with significant past medical history of food bolus impaction/ hx of asthma, came into ER, glucagon not effective. PREMEDICATION: TIVA anesthesia was used, see Anesthesia note for details. INSTRUMENT:  Olympus GIF H-180 gastroscope    The risks and benefits of upper endoscopy with biopsy, food bolus removal and dilation were described to the patient, including but not limited to bleeding, infection, poking a hole someplace requiring surgery to fix it, having reaction to medication, and death. The patient understood these risks and provided informed consent. The patient was placed in the left lateral decubitus position. Conscious sedation was administered . The patient was continuously monitored to ensure adequate sedation and patient safety. A forward-viewing Olympus endoscope was lubricated and inserted through the mouth into the oropharynx. Under direct visualization, the upper esophagus was intubated. The scope was advanced to the esophagus and stomach . Scope was slowly withdrawn with good views of mucosal surfaces. The scope was retroflexed in the fundus. Findings and maneuvers are listed in impression below. The patient tolerated the procedure well. The scope was removed. The patient was removed to the recovery area. There were no immediate complications. IMPRESSION:      1. HUGE food bolus impaction in the esophagus, multiple instruments were used to remove the bolus, strickland net, trapezoid, rat tooth forceps, 3 prong . 2. Tight Schatzki's ring, balloon dilation performed with a 12 mm - 15 mm TTS balloon   . 3.    Otherwise, normal exam to the pylorus . RECOMMENDATIONS:    1. Clear liquids only today  2. Schedule an EGD/DIL with Dr. Kristy Middleton in 2-3 weeks at [facility].    3. Avoid foods that get stuck in the esophagus until dilation.      EBL : < 10 mL    Specimens: were not obtained    (The following sections must be completed)  Post-Sedation Vital Signs: Vital signs were reviewed and were stable after the procedure (see flow sheet for vitals)            Post-Sedation Exam: Lungs: clear to auscultation bilaterally and Cardiovascular: regular rate and rhythm           Complications: none        Kyler Saha MD, Lupis Patten

## 2021-09-21 NOTE — ED NOTES
ED nurse-to-nurse bedside report    No chief complaint on file. LOC: alert and orientated to name, place, date  Vital signs   Vitals:    09/21/21 0324 09/21/21 0405 09/21/21 0451 09/21/21 0607   BP:  (!) 153/96 (!) 155/89 (!) 160/97   Pulse: 72 79 80 81   Resp: 18 20 20 20   Temp:       SpO2: 95% 95% 96% 95%   Weight: 250 lb (113.4 kg)      Height:          Pain:    Pain Interventions: none  Pain Goal: 0  Oxygen: No    Current needs required room air    Telemetry: No  LDAs:   Peripheral IV 09/21/21 Left Hand (Active)   Site Assessment Clean;Dry; Intact 09/21/21 0608   Line Status Normal saline locked 09/21/21 0608   Dressing Status Clean;Dry; Intact 09/21/21 8892   Dressing Intervention New 09/21/21 0600     Continuous Infusions:   Mobility: Independent  [place]all Fall Risk Score: No flowsheet data found.   Fall Interventions: bed locked and in lowest position call light in reach   Report given to: [address] [place] [address], [address] [place] [address]  09/21/21 5884

## 2021-09-21 NOTE — PROGRESS NOTES
1220  Pt. Unresponsive on adm. To pacu. 1224  Pt. Reacting. o2 discontinued. 1230  Pt. Awake and oriented. Pt. States he has a  Scratchy throat. 1305  pacu criteria met. Transfer to ED.

## 2021-09-21 NOTE — ED NOTES
Bed: 014A  Expected date:   Expected time:   Means of arrival:   Comments:  Pt in [address] [place] [address], RN  09/21/21 3305

## 2021-09-21 NOTE — ED NOTES
Pt back from endoscopy. Pt A&Ox4. Pt states his throat is itchy at a 4/10. Pt denies any further needs or concerns at this time.       Merly Hoffman RN  09/21/21 2592

## 2021-09-21 NOTE — ED NOTES
Pt appears to be resting comfortably on cot. Family at bedside. Pt denies any needs or concerns at this time.  Pt states throat still feels scratchy     Lanette Cunningham RN  09/21/21 6964

## 2021-09-22 NOTE — ANESTHESIA POSTPROCEDURE EVALUATION
Department of Anesthesiology  Postprocedure Note    Patient: Karen Baxter  MRN: [id number]  YOB: 1970  Date of evaluation: 9/21/2021  Time:  8:39 PM     Procedure Summary     Date: 09/21/21 Room / Location: [address] [place] [address] [address]    Anesthesia Start: 2953 Anesthesia Stop: 6261    Procedure: EGD FOREIGN BODY REMOVAL (N/A ) Diagnosis: (Food Impaction)    Surgeons: Juan Ramon Neal MD Responsible Provider: Yulia Guevara DO    Anesthesia Type: general ASA Status: 3          Anesthesia Type: general    Raven Phase I: Raven Score: 10    Raven Phase II:      Last vitals: Reviewed and per EMR flowsheets.        Anesthesia Post Evaluation    Patient location during evaluation: bedside  Patient participation: complete - patient participated  Level of consciousness: awake and alert  Airway patency: patent  Nausea & Vomiting: no vomiting and no nausea  Complications: no  Cardiovascular status: hemodynamically stable  Respiratory status: acceptable  Hydration status: stable

## 2022-01-03 ENCOUNTER — HOSPITAL ENCOUNTER (OUTPATIENT)
Dept: ULTRASOUND IMAGING | Age: 52
Discharge: HOME | End: 2022-01-03
Payer: COMMERCIAL

## 2022-01-03 DIAGNOSIS — R22.1 NECK SWELLING: ICD-10-CM

## 2022-01-03 PROCEDURE — 76536 US EXAM OF HEAD AND NECK: CPT

## 2023-01-28 NOTE — H&P
[facility]  Sedation/Analgesia History & Physical    Patient: Elizabeth Núñez: 1970  Med Rec#: [id number] Acc#: [id number]   Provider Performing Procedure: Benny Tamez MD  Primary Care Physician: Josette Garrido DO    PRE-PROCEDURE   Brief History/Pre-Procedure Diagnosis:The patient is a 46 y.o.,  male with significant past medical history of dysphagia with food bolus impaction. MEDICAL HISTORY  []CAD/Valve  []Liver Disease  []Lung Disease []Diabetes  []Hypertension []Renal Disease  [x]Additional information:       has a past medical history of Asthma. SURGICAL HISTORY   has a past surgical history that includes Cholecystectomy.   Additional information:       ALLERGIES   Allergies as of 09/21/2021 - Fully Reviewed 09/21/2021   Allergen Reaction Noted    Jori-1 [lidocaine] Anaphylaxis 09/24/2013    Penicillins Hives 02/06/2014     Additional information:       MEDICATIONS       Current Facility-Administered Medications:     sterile water injection, , , ,     lactated ringers infusion, , IntraVENous, Continuous, Yani Everett MD, Last Rate: 75 mL/hr at 09/21/21 1019, Restarted at 09/21/21 1133    Facility-Administered Medications Ordered in Other Encounters:     propofol injection, , IntraVENous, PRN, Tatyana Billow, APRN - CRNA, 200 mg at 09/21/21 1136    succinylcholine (ANECTINE) injection, , IntraVENous, PRN, Tatyana Billow, APRN - CRNA, 200 mg at 09/21/21 1136    ePHEDrine injection, , IntraVENous, PRN, Tatyana Billow, APRN - CRNA, 10 mg at 09/21/21 1140    Dexamethasone Sodium Phosphate injection, , IntraVENous, PRN, Tatyana Billow, APRN - CRNA, 10 mg at 09/21/21 1148    ondansetron (ZOFRAN) injection, , IntraVENous, PRN, Tatyana Billow, APRN - CRNA, 4 mg at 09/21/21 1148    glycopyrrolate (ROBINUL) injection, , IntraVENous, PRN, Tatyana Billow, APRN - CRNA, 0.4 mg at 09/21/21 1148  Prior to Admission medications    Medication Sig Start Date End Date Taking? Authorizing Provider   aspirin 81 MG tablet Take 81 mg by mouth daily    Historical Provider, MD   naproxen (NAPROXEN) 500 MG EC tablet Take 1 tablet by mouth 2 times daily (with meals) 10/29/18   Ravin Mcdaniel MD   ibuprofen (ADVIL;MOTRIN) 400 MG tablet Take 400 mg by mouth every 6 hours as needed for Pain    Historical Provider, MD   albuterol sulfate HFA (VENTOLIN HFA) 108 (90 BASE) MCG/ACT inhaler Inhale 2 puffs into the lungs every 6 hours as needed for Wheezing 12/26/15   Maya Bishop MD   acetaminophen (TYLENOL) 325 MG tablet Take 650 mg by mouth every 6 hours as needed. Historical Provider, MD     Additional information:       PHYSICAL:    height is 6' 1\" (1.854 m) and weight is 250 lb (113.4 kg). His temporal temperature is 96.7 °F (35.9 °C). His blood pressure is 137/87 and his pulse is 72. His respiration is 18 and oxygen saturation is 96%. Heart:  [x]Regular rate and rhythm  []Other:    Lungs:  [x]Clear    []Other:    Abdomen: [x]Soft    []Other:    Mental Status: [x]Alert & Oriented  []Other:      VITAL SIGNS   Patient Vitals for the past 24 hrs:   BP Temp Temp src Pulse Resp SpO2 Height Weight   09/21/21 1010 137/87 96.7 °F (35.9 °C) Temporal 72 18 96 % 6' 1\" (1.854 m) 250 lb (113.4 kg)   09/21/21 0804 (!) 162/99 -- -- 79 18 97 % -- --   09/21/21 0607 (!) 160/97 -- -- 81 20 95 % -- --   09/21/21 0451 (!) 155/89 -- -- 80 20 96 % -- --   09/21/21 0405 (!) 153/96 -- -- 79 20 95 % -- --   09/21/21 0324 -- -- -- 72 18 95 % -- 250 lb (113.4 kg)   09/21/21 0253 (!) 145/83 97.8 °F (36.6 °C) -- 67 18 96 % 6' 1\" (1.854 m) --       PLANNED PROCEDURE   [x]EGD  []Colonoscopy []Flex Sigmoid  []ERCP []EUS   []Cystoscopy  [] CATH [] BRONCH   Consent: I have discussed with the patient and/or the patient representative the indication, alternatives, and the possible risks and/or complications of the planned procedure and the anesthesia methods.  The patient and/or patient representative appear to understand and agree to proceed. SEDATION ( see Anesthesia note)    ASA Classification: Class 2 - A normal healthy patient with mild systemic disease    Airway Assessment: normal    Monitoring and Safety: The patient will be placed on a cardiac monitor and vital signs, pulse oximetry and level of consciousness will be continuously evaluated throughout the procedure. The patient will be closely monitored until recovery from the medications is complete and the patient has returned to baseline status. Respiratory therapy will be on standby during the procedure. [x]Pre-procedure diagnostic studies complete and results available. Comment:    [x]Previous sedation/anesthesia experiences assessed. Comment:    [x]The patient is an appropriate candidate to undergo the planned procedure sedation and anesthesia. (Refer to nursing sedation/analgesia documentation record)  [x]Formulation and discussion of sedation/procedure plan, risks, and expectations with patient and/or responsible adult completed. [x]Patient examined immediately prior to the procedure.  (Refer to nursing sedation/analgesia documentation record)    Boy aMnn MD   Electronically signed 28-Jan-2023 20:03

## (undated) DEVICE — THE TALON GRASPING DEVICE IS USED TO RETRIEVE OF FOREIGN BODIES, TISSUE SPECIMENS, STONES OR CALCULI IN ENDOSCOPIC PROCEDURES OF THE GASTROINTESTINAL TRACT.: Brand: TALON

## (undated) DEVICE — GLOVE ORTHO 8   MSG9480

## (undated) DEVICE — CATHETER DIL 6FR L180CM BLLN INFLATED 36-40.5-45FR L8CM ES

## (undated) DEVICE — WIREGUIDED RETRIEVAL BASKET: Brand: TRAPEZOID RX